# Patient Record
Sex: MALE | Race: WHITE | NOT HISPANIC OR LATINO | URBAN - METROPOLITAN AREA
[De-identification: names, ages, dates, MRNs, and addresses within clinical notes are randomized per-mention and may not be internally consistent; named-entity substitution may affect disease eponyms.]

---
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
## 2024-11-26 NOTE — ED STATDOCS - ABNORMAL RHYTHM
Skye from the [facility] called to advise patient has an appt on  Friday 11/29/24 and they will discuss the Elltrevinis hold at that time. She states she will also call the patient and let  him know this as well.   sinus arrhythmia